# Patient Record
Sex: FEMALE | Race: WHITE | Employment: OTHER | URBAN - METROPOLITAN AREA
[De-identification: names, ages, dates, MRNs, and addresses within clinical notes are randomized per-mention and may not be internally consistent; named-entity substitution may affect disease eponyms.]

---

## 2024-01-30 ENCOUNTER — OFFICE VISIT (OUTPATIENT)
Dept: OBGYN CLINIC | Age: 69
End: 2024-01-30
Payer: MEDICARE

## 2024-01-30 VITALS
HEIGHT: 66 IN | WEIGHT: 213 LBS | DIASTOLIC BLOOD PRESSURE: 80 MMHG | BODY MASS INDEX: 34.23 KG/M2 | SYSTOLIC BLOOD PRESSURE: 134 MMHG

## 2024-01-30 DIAGNOSIS — F34.1 DYSTHYMIA: ICD-10-CM

## 2024-01-30 DIAGNOSIS — Z78.0 MENOPAUSE: Primary | ICD-10-CM

## 2024-01-30 PROCEDURE — G8427 DOCREV CUR MEDS BY ELIG CLIN: HCPCS | Performed by: OBSTETRICS & GYNECOLOGY

## 2024-01-30 PROCEDURE — 99203 OFFICE O/P NEW LOW 30 MIN: CPT | Performed by: OBSTETRICS & GYNECOLOGY

## 2024-01-30 PROCEDURE — 1123F ACP DISCUSS/DSCN MKR DOCD: CPT | Performed by: OBSTETRICS & GYNECOLOGY

## 2024-01-30 PROCEDURE — G8484 FLU IMMUNIZE NO ADMIN: HCPCS | Performed by: OBSTETRICS & GYNECOLOGY

## 2024-01-30 PROCEDURE — G8417 CALC BMI ABV UP PARAM F/U: HCPCS | Performed by: OBSTETRICS & GYNECOLOGY

## 2024-01-30 PROCEDURE — 3017F COLORECTAL CA SCREEN DOC REV: CPT | Performed by: OBSTETRICS & GYNECOLOGY

## 2024-01-30 PROCEDURE — 1090F PRES/ABSN URINE INCON ASSESS: CPT | Performed by: OBSTETRICS & GYNECOLOGY

## 2024-01-30 PROCEDURE — 1036F TOBACCO NON-USER: CPT | Performed by: OBSTETRICS & GYNECOLOGY

## 2024-01-30 PROCEDURE — G8400 PT W/DXA NO RESULTS DOC: HCPCS | Performed by: OBSTETRICS & GYNECOLOGY

## 2024-01-30 RX ORDER — CHOLECALCIFEROL (VITAMIN D3) 25 MCG
TABLET,CHEWABLE ORAL
COMMUNITY
Start: 2023-12-05

## 2024-01-30 ASSESSMENT — PATIENT HEALTH QUESTIONNAIRE - PHQ9
2. FEELING DOWN, DEPRESSED OR HOPELESS: 0
SUM OF ALL RESPONSES TO PHQ QUESTIONS 1-9: 1
SUM OF ALL RESPONSES TO PHQ QUESTIONS 1-9: 1
1. LITTLE INTEREST OR PLEASURE IN DOING THINGS: 1
SUM OF ALL RESPONSES TO PHQ QUESTIONS 1-9: 1
SUM OF ALL RESPONSES TO PHQ9 QUESTIONS 1 & 2: 1
SUM OF ALL RESPONSES TO PHQ QUESTIONS 1-9: 1

## 2024-01-30 NOTE — PROGRESS NOTES
Pt comes in today as new pt. Pt would like to discuss HRT. Pt states she has fatigue, noticed skin changes, and can not lose weight. Also states no libido.      LAST PAP:  pt states about a year ago     LAST MAMMO:  pt states around a year ago     LMP:  No LMP recorded. Patient is postmenopausal.     BIRTH CONTROL:  post menopausal status    TOBACCO USE:  No    FAMILY HISTORY OF:   Breast Cancer:  Yes   Ovarian Cancer:  No   Uterine Cancer:  No   Colon Cancer:  No    Vitals:    01/30/24 1049   BP: 134/80   Site: Left Upper Arm   Position: Sitting   Weight: 96.6 kg (213 lb)   Height: 1.676 m (5' 6\")        Jazzmine Rodriguez MA  01/30/24  11:09 AM

## 2024-01-30 NOTE — PROGRESS NOTES
[facility]  [address] [facility], [place] [address]  [phone]    Sruthi Mann MD, FACOG  Jose Byrd MD, FACOG  JANICE Foss-BC      Assessment/Plan     Cydney was seen today for new patient.    Diagnoses and all orders for this visit:    Menopause  Comments:  - do not recommend HRT given > 62 and > 10 yrs from menopause. Risks outweigh benefit. Risks discussed.   -discussed HRT is only FDA indicated for mod/severe vasomotor symptoms which she has not experienced  - discussed HRT not indicated to give energy or help with weight loss. Recommend PCP to f/u on these concerns.       Dysthymia  Comments:  - sypmtoms more c/w mood d/o  - she did well with prozac > 30 yrs ago but recently had side effects  - recommended F/u with PCP to discuss other options for tx    BMI 34.0-34.9,adult  Comments:  - encouraged exercise and healthy diet  - discussed strenght training              Subjective     Cydney Bender 68 y.o.  presents today for a gyn problem of wants to discuss HRT. New patient. Menopause 10-11 years ago. Did not take HRT. Reports having an easy transition into menopause and did not have hot flashes or night sweats. She did take a compounded progesterone cream > 20 years ago for similar complaints as below and reports she had increased energy.     Currently, reporting feeling fatigued and no motivation to do anything. Trouble loosing weight, decreased libido. She does not feel \"depressed\" but wants to feel better and more like her self with improved energy. Was on prozac in her 30s and felt great but recently tried again x 1 month and had dizzy spells and stopped. Also reports brain fog.         LAST PAP: approx 1 year ago (per pt)    LAST MAMMO: approx 1 year ago (per pt)    LMP: No LMP recorded. Patient is postmenopausal.    BIRTH CONTROL: post menopausal status      OB History    Para Term  AB Living   3 1 1   2 1   SAB IAB Ectopic Molar Multiple Live Births